# Patient Record
Sex: MALE | Race: WHITE | NOT HISPANIC OR LATINO | URBAN - METROPOLITAN AREA
[De-identification: names, ages, dates, MRNs, and addresses within clinical notes are randomized per-mention and may not be internally consistent; named-entity substitution may affect disease eponyms.]

---

## 2022-05-17 ENCOUNTER — EMERGENCY (EMERGENCY)
Facility: HOSPITAL | Age: 57
LOS: 1 days | Discharge: HOME | End: 2022-05-17
Attending: STUDENT IN AN ORGANIZED HEALTH CARE EDUCATION/TRAINING PROGRAM | Admitting: STUDENT IN AN ORGANIZED HEALTH CARE EDUCATION/TRAINING PROGRAM
Payer: COMMERCIAL

## 2022-05-17 VITALS
HEART RATE: 72 BPM | TEMPERATURE: 99 F | DIASTOLIC BLOOD PRESSURE: 77 MMHG | OXYGEN SATURATION: 99 % | SYSTOLIC BLOOD PRESSURE: 130 MMHG | RESPIRATION RATE: 15 BRPM | HEIGHT: 71 IN | WEIGHT: 175.05 LBS

## 2022-05-17 LAB
ALBUMIN SERPL ELPH-MCNC: 3.7 G/DL — SIGNIFICANT CHANGE UP (ref 3.3–5)
ALP SERPL-CCNC: 55 U/L — SIGNIFICANT CHANGE UP (ref 40–120)
ALT FLD-CCNC: 19 U/L — SIGNIFICANT CHANGE UP (ref 12–78)
ANION GAP SERPL CALC-SCNC: 8 MMOL/L — SIGNIFICANT CHANGE UP (ref 5–17)
APPEARANCE UR: CLEAR — SIGNIFICANT CHANGE UP
AST SERPL-CCNC: 21 U/L — SIGNIFICANT CHANGE UP (ref 15–37)
BACTERIA # UR AUTO: ABNORMAL
BASOPHILS # BLD AUTO: 0.03 K/UL — SIGNIFICANT CHANGE UP (ref 0–0.2)
BASOPHILS NFR BLD AUTO: 0.2 % — SIGNIFICANT CHANGE UP (ref 0–2)
BILIRUB SERPL-MCNC: 1.3 MG/DL — HIGH (ref 0.2–1.2)
BILIRUB UR-MCNC: NEGATIVE — SIGNIFICANT CHANGE UP
BUN SERPL-MCNC: 17 MG/DL — SIGNIFICANT CHANGE UP (ref 7–23)
CALCIUM SERPL-MCNC: 9.8 MG/DL — SIGNIFICANT CHANGE UP (ref 8.5–10.1)
CHLORIDE SERPL-SCNC: 107 MMOL/L — SIGNIFICANT CHANGE UP (ref 96–108)
CO2 SERPL-SCNC: 24 MMOL/L — SIGNIFICANT CHANGE UP (ref 22–31)
COLOR SPEC: YELLOW — SIGNIFICANT CHANGE UP
CREAT SERPL-MCNC: 0.97 MG/DL — SIGNIFICANT CHANGE UP (ref 0.5–1.3)
DIFF PNL FLD: ABNORMAL
EGFR: 92 ML/MIN/1.73M2 — SIGNIFICANT CHANGE UP
EOSINOPHIL # BLD AUTO: 0.03 K/UL — SIGNIFICANT CHANGE UP (ref 0–0.5)
EOSINOPHIL NFR BLD AUTO: 0.2 % — SIGNIFICANT CHANGE UP (ref 0–6)
EPI CELLS # UR: SIGNIFICANT CHANGE UP
GLUCOSE SERPL-MCNC: 109 MG/DL — HIGH (ref 70–99)
GLUCOSE UR QL: NEGATIVE — SIGNIFICANT CHANGE UP
HCT VFR BLD CALC: 44.4 % — SIGNIFICANT CHANGE UP (ref 39–50)
HGB BLD-MCNC: 16.1 G/DL — SIGNIFICANT CHANGE UP (ref 13–17)
IMM GRANULOCYTES NFR BLD AUTO: 0.4 % — SIGNIFICANT CHANGE UP (ref 0–1.5)
KETONES UR-MCNC: ABNORMAL
LEUKOCYTE ESTERASE UR-ACNC: ABNORMAL
LIDOCAIN IGE QN: 68 U/L — LOW (ref 73–393)
LYMPHOCYTES # BLD AUTO: 17.7 % — SIGNIFICANT CHANGE UP (ref 13–44)
LYMPHOCYTES # BLD AUTO: 2.79 K/UL — SIGNIFICANT CHANGE UP (ref 1–3.3)
MCHC RBC-ENTMCNC: 31.6 PG — SIGNIFICANT CHANGE UP (ref 27–34)
MCHC RBC-ENTMCNC: 36.3 GM/DL — HIGH (ref 32–36)
MCV RBC AUTO: 87.1 FL — SIGNIFICANT CHANGE UP (ref 80–100)
MONOCYTES # BLD AUTO: 1.16 K/UL — HIGH (ref 0–0.9)
MONOCYTES NFR BLD AUTO: 7.4 % — SIGNIFICANT CHANGE UP (ref 2–14)
NEUTROPHILS # BLD AUTO: 11.68 K/UL — HIGH (ref 1.8–7.4)
NEUTROPHILS NFR BLD AUTO: 74.1 % — SIGNIFICANT CHANGE UP (ref 43–77)
NITRITE UR-MCNC: NEGATIVE — SIGNIFICANT CHANGE UP
NRBC # BLD: 0 /100 WBCS — SIGNIFICANT CHANGE UP (ref 0–0)
OB PNL STL: POSITIVE
PH UR: 6 — SIGNIFICANT CHANGE UP (ref 5–8)
PLATELET # BLD AUTO: 311 K/UL — SIGNIFICANT CHANGE UP (ref 150–400)
POTASSIUM SERPL-MCNC: 4.3 MMOL/L — SIGNIFICANT CHANGE UP (ref 3.5–5.3)
POTASSIUM SERPL-SCNC: 4.3 MMOL/L — SIGNIFICANT CHANGE UP (ref 3.5–5.3)
PROT SERPL-MCNC: 7.4 G/DL — SIGNIFICANT CHANGE UP (ref 6–8.3)
PROT UR-MCNC: NEGATIVE — SIGNIFICANT CHANGE UP
RBC # BLD: 5.1 M/UL — SIGNIFICANT CHANGE UP (ref 4.2–5.8)
RBC # FLD: 12.6 % — SIGNIFICANT CHANGE UP (ref 10.3–14.5)
RBC CASTS # UR COMP ASSIST: SIGNIFICANT CHANGE UP /HPF (ref 0–4)
SODIUM SERPL-SCNC: 139 MMOL/L — SIGNIFICANT CHANGE UP (ref 135–145)
SP GR SPEC: 1.01 — SIGNIFICANT CHANGE UP (ref 1.01–1.02)
UROBILINOGEN FLD QL: NEGATIVE — SIGNIFICANT CHANGE UP
WBC # BLD: 15.75 K/UL — HIGH (ref 3.8–10.5)
WBC # FLD AUTO: 15.75 K/UL — HIGH (ref 3.8–10.5)
WBC UR QL: SIGNIFICANT CHANGE UP

## 2022-05-17 PROCEDURE — 99284 EMERGENCY DEPT VISIT MOD MDM: CPT | Mod: 25

## 2022-05-17 PROCEDURE — 74176 CT ABD & PELVIS W/O CONTRAST: CPT | Mod: 26,MA

## 2022-05-17 PROCEDURE — 85025 COMPLETE CBC W/AUTO DIFF WBC: CPT

## 2022-05-17 PROCEDURE — 82272 OCCULT BLD FECES 1-3 TESTS: CPT

## 2022-05-17 PROCEDURE — 80053 COMPREHEN METABOLIC PANEL: CPT

## 2022-05-17 PROCEDURE — 96375 TX/PRO/DX INJ NEW DRUG ADDON: CPT

## 2022-05-17 PROCEDURE — 99285 EMERGENCY DEPT VISIT HI MDM: CPT

## 2022-05-17 PROCEDURE — 87086 URINE CULTURE/COLONY COUNT: CPT

## 2022-05-17 PROCEDURE — 74176 CT ABD & PELVIS W/O CONTRAST: CPT | Mod: MA

## 2022-05-17 PROCEDURE — 36415 COLL VENOUS BLD VENIPUNCTURE: CPT

## 2022-05-17 PROCEDURE — 83690 ASSAY OF LIPASE: CPT

## 2022-05-17 PROCEDURE — 81001 URINALYSIS AUTO W/SCOPE: CPT

## 2022-05-17 PROCEDURE — 96374 THER/PROPH/DIAG INJ IV PUSH: CPT

## 2022-05-17 RX ORDER — FAMOTIDINE 10 MG/ML
20 INJECTION INTRAVENOUS ONCE
Refills: 0 | Status: COMPLETED | OUTPATIENT
Start: 2022-05-17 | End: 2022-05-17

## 2022-05-17 RX ORDER — ONDANSETRON 8 MG/1
4 TABLET, FILM COATED ORAL ONCE
Refills: 0 | Status: COMPLETED | OUTPATIENT
Start: 2022-05-17 | End: 2022-05-17

## 2022-05-17 RX ORDER — ONDANSETRON 8 MG/1
1 TABLET, FILM COATED ORAL
Qty: 15 | Refills: 0
Start: 2022-05-17 | End: 2022-05-21

## 2022-05-17 RX ORDER — TAMSULOSIN HYDROCHLORIDE 0.4 MG/1
1 CAPSULE ORAL
Qty: 21 | Refills: 0
Start: 2022-05-17 | End: 2022-06-06

## 2022-05-17 RX ORDER — MORPHINE SULFATE 50 MG/1
4 CAPSULE, EXTENDED RELEASE ORAL ONCE
Refills: 0 | Status: DISCONTINUED | OUTPATIENT
Start: 2022-05-17 | End: 2022-05-17

## 2022-05-17 RX ORDER — SODIUM CHLORIDE 9 MG/ML
1000 INJECTION INTRAMUSCULAR; INTRAVENOUS; SUBCUTANEOUS ONCE
Refills: 0 | Status: COMPLETED | OUTPATIENT
Start: 2022-05-17 | End: 2022-05-17

## 2022-05-17 RX ADMIN — ONDANSETRON 4 MILLIGRAM(S): 8 TABLET, FILM COATED ORAL at 11:46

## 2022-05-17 RX ADMIN — FAMOTIDINE 20 MILLIGRAM(S): 10 INJECTION INTRAVENOUS at 11:46

## 2022-05-17 RX ADMIN — SODIUM CHLORIDE 1000 MILLILITER(S): 9 INJECTION INTRAMUSCULAR; INTRAVENOUS; SUBCUTANEOUS at 11:28

## 2022-05-17 RX ADMIN — MORPHINE SULFATE 4 MILLIGRAM(S): 50 CAPSULE, EXTENDED RELEASE ORAL at 11:46

## 2022-05-17 NOTE — ED PROVIDER NOTE - NS ED ROS FT
Constitutional: No reported recent fever.  Neurological: No reported acute headache.  Eyes: No reported new vision changes.   Ears, Nose, Mouth, Throat: No reported acute sore throat.  Cardiovascular: No reported current chest pain.  Respiratory: No reported new shortness of breath.  Gastrointestinal: See HPI.   Genitourinary: No reported new urinary problems.  Musculoskeletal: No reported acute extremity pain.  Integumentary (skin and/or breast): No reported new rash.

## 2022-05-17 NOTE — ED PROVIDER NOTE - CLINICAL SUMMARY MEDICAL DECISION MAKING FREE TEXT BOX
initial concern for diverticulitis v colitis. found to have kidney stone. rectal bleeding likely from hemorrhoids. patient comfortable, will discharge with Urology follow up..

## 2022-05-17 NOTE — ED PROVIDER NOTE - MDM ORDERS SUBMITTED SELECTION
Spoke with Dr. Clyde Sabillon, urologist, he states to leave sheppard in at this time. Someone will be in tomorrow to see patient. Labs/Imaging Studies/Medications

## 2022-05-17 NOTE — ED PROVIDER NOTE - OBJECTIVE STATEMENT
nausea, vomiting, abdominal pain, LLQ. had BM with blood x1. feels dizzy. history brain aneyursm, seizures, not on meds. 56 M here with son complaining of nausea, vomiting, abdominal pain, LLQ. Pain severe. had BM with blood x1. feels dizzy. history brain aneurysm, seizures, not on meds. no hematuria, dysuria.

## 2022-05-17 NOTE — ED PROVIDER NOTE - CARE PROVIDER_API CALL
Jean Paul Bermudez)  Urology  [address], [address]  [place] [address]  Phone: [phone]  Fax: [phone]  Follow Up Time: 4-6 Days

## 2022-05-17 NOTE — ED PROVIDER NOTE - NSFOLLOWUPINSTRUCTIONS_ED_ALL_ED_FT
Please follow up with your Primary Care Physician and any specialists as discussed.  Please take your medications as prescribed.  If your symptoms persist or worsen, please seek care. Either return to the Emergency Department, go to urgent care or see your primary care doctor.  See refer to general information and follow up instructions below:     Kidney Stones    WHAT YOU NEED TO KNOW:    Kidney stones form in the urinary system when the water and waste in your urine are out of balance. When this happens, certain types of waste crystals separate from the urine. The crystals build up and form kidney stones. You may have more than one kidney stone.     DISCHARGE INSTRUCTIONS:    Return to the emergency department if:     You have vomiting that is not relieved by medicine.        Contact your healthcare provider if:     You have a fever.       You have trouble passing urine.      You see blood in your urine.      You have severe pain.      You have any questions or concerns about your condition or care.    Medicines:     NSAIDs, such as ibuprofen, help decrease swelling, pain, and fever. This medicine is available with or without a doctor's order. NSAIDs can cause stomach bleeding or kidney problems in certain people. If you take blood thinner medicine, always ask your healthcare provider if NSAIDs are safe for you. Always read the medicine label and follow directions.      Prescription pain medicine may be given. Ask your healthcare provider how to take this medicine safely. Some prescription pain medicines contain acetaminophen. Do not take other medicines that contain acetaminophen without talking to your healthcare provider. Too much acetaminophen may cause liver damage. Prescription pain medicine may cause constipation. Ask your healthcare provider how to prevent or treat constipation.       Medicines to balance your electrolytes may be needed.       Take your medicine as directed. Contact your healthcare provider if you think your medicine is not helping or if you have side effects. Tell him or her if you are allergic to any medicine. Keep a list of the medicines, vitamins, and herbs you take. Include the amounts, and when and why you take them. Bring the list or the pill bottles to follow-up visits. Carry your medicine list with you in case of an emergency.    Follow up with your healthcare provider as directed: You may need to return for more tests. Write down your questions so you remember to ask them during your visits.    What you can do to manage kidney stones:     Drink more liquids. Your healthcare provider may tell you to drink at least 8 to 12 (eight-ounce) cups of liquids each day. This helps flush out the kidney stones when you urinate. Water is the best liquid to drink.      Strain your urine every time you go to the bathroom. Urinate through a strainer or a piece of thin cloth to catch the stones. Take the stones to your healthcare provider so they can be sent to the lab for tests. This will help your healthcare providers plan the best treatment for you.Look for Stones in the Filter           Eat a variety of healthy foods. Healthy foods include fruits, vegetables, whole-grain breads, low-fat dairy products, beans, and fish. You may need to limit how much sodium (salt) or protein you eat. Ask for information about the best foods for you.      Stay active. Your stones may pass more easily if you stay active. Exercise can also help you manage your weight. Ask about the best activities for you.    After you pass the kidney stones: Your healthcare provider may order a 24-hour urine test. Results from a 24-hour urine test will help your healthcare provider plan ways to prevent more stones from forming. Your healthcare provider will give you more instructions.

## 2022-05-17 NOTE — ED PROVIDER NOTE - PHYSICAL EXAMINATION
Vital signs as available reviewed.  General:  No acute distress.  Head:  Normocephalic, atraumatic.  Eyes:  Conjunctiva pink, no icterus.  Cardiovascular:  Regular rate, no obvious murmur.  Respiratory:  Clear to auscultation, good air entry bilaterally.  Abdomen:  Soft, left flank / LLQ tenderness to palpation.  Musculoskeletal:  No obvious deformity.  Neurologic: Alert and oriented, moving all extremities.  Skin:  Warm and dry.

## 2022-05-17 NOTE — ED PROVIDER NOTE - PATIENT PORTAL LINK FT
You can access the FollowMyHealth Patient Portal offered by  by registering at the following website: [email or web address]. By joining [facility]’s FollowMyHealth portal, you will also be able to view your health information using other applications (apps) compatible with our system.

## 2022-05-18 LAB
CULTURE RESULTS: NO GROWTH — SIGNIFICANT CHANGE UP
SPECIMEN SOURCE: SIGNIFICANT CHANGE UP
